# Patient Record
Sex: FEMALE | Race: WHITE | NOT HISPANIC OR LATINO | Employment: FULL TIME | URBAN - METROPOLITAN AREA
[De-identification: names, ages, dates, MRNs, and addresses within clinical notes are randomized per-mention and may not be internally consistent; named-entity substitution may affect disease eponyms.]

---

## 2017-05-23 ENCOUNTER — OFFICE VISIT (OUTPATIENT)
Dept: SLEEP MEDICINE | Facility: CLINIC | Age: 42
End: 2017-05-23
Payer: COMMERCIAL

## 2017-05-23 VITALS
SYSTOLIC BLOOD PRESSURE: 130 MMHG | WEIGHT: 228 LBS | BODY MASS INDEX: 30.88 KG/M2 | OXYGEN SATURATION: 98 % | DIASTOLIC BLOOD PRESSURE: 88 MMHG | HEIGHT: 72 IN | HEART RATE: 71 BPM

## 2017-05-23 DIAGNOSIS — F51.04 PSYCHOPHYSIOLOGICAL INSOMNIA: Primary | ICD-10-CM

## 2017-05-23 PROCEDURE — 99204 OFFICE O/P NEW MOD 45 MIN: CPT | Mod: S$GLB,,, | Performed by: INTERNAL MEDICINE

## 2017-05-23 PROCEDURE — 99999 PR PBB SHADOW E&M-NEW PATIENT-LVL III: CPT | Mod: PBBFAC,,, | Performed by: INTERNAL MEDICINE

## 2017-05-23 PROCEDURE — 1160F RVW MEDS BY RX/DR IN RCRD: CPT | Mod: S$GLB,,, | Performed by: INTERNAL MEDICINE

## 2017-05-23 RX ORDER — ZALEPLON 5 MG/1
5 CAPSULE ORAL NIGHTLY PRN
Qty: 30 CAPSULE | Refills: 0 | Status: SHIPPED | OUTPATIENT
Start: 2017-05-23 | End: 2017-06-22

## 2017-05-23 NOTE — PROGRESS NOTES
Aura Lynn  was seen as a new patient for the evaluation of  Restless sleep.    CHIEF COMPLAINT:    Chief Complaint   Patient presents with    Sleep Apnea    Snoring       HISTORY OF PRESENT ILLNESS: Aura Lynn is a 41 y.o. female is here for sleep evaluation.   Patient with early awakening since 7/16 daily.  +difficulty going back sleep.  In the past, patient would wake up without difficulty falling back to sleep.  +worsening of snoring per boyfriend.  No witnessed apnea but awoken from sleep a/w snoring.   Patient take Benadryl about 2 times per week and sleep better.  No parasomnia.  No rls symptoms.  No daytime sleepiness.  No cataplexy.      [place] Sleepiness Scale score during initial sleep evaluation was 2.    SLEEP ROUTINE:  Activity the hour prior to sleep: watch tv in bed    Bed partner:  boyfriend  Time to bed:  9:30 pm   Lights off:  yes  Sleep onset latency:  5-10 minutes        Disruptions or awakenings:    3-4 times     Wakeup time:      5:30 am   Perceived sleep quality:  Tire on most day; feel better if sleep with benadryl       Daytime naps:      none  Weekend sleep routine:      10-11 pm to 7 am (better on weekend)  Caffeine use: 1 soda per day and green tea  exercise habit:   Weight train 4 days per week and cardio 5 days per week      PAST MEDICAL HISTORY:    Active Ambulatory Problems     Diagnosis Date Noted    No Active Ambulatory Problems     Resolved Ambulatory Problems     Diagnosis Date Noted    No Resolved Ambulatory Problems     No Additional Past Medical History                PAST SURGICAL HISTORY:    History reviewed. No pertinent surgical history.      FAMILY HISTORY:                History reviewed. No pertinent family history.    SOCIAL HISTORY:          Tobacco:   History   Smoking Status    Never Smoker   Smokeless Tobacco    Never Used       alcohol use:    History   Alcohol Use    1.2 oz/week    2 Glasses of wine per week                 Occupation:   "    ALLERGIES:  Review of patient's allergies indicates:  No Known Allergies    CURRENT MEDICATIONS:    No current outpatient prescriptions on file.     No current facility-administered medications for this visit.                   REVIEW OF SYSTEMS:     Sleep related symptoms as per HPI.  CONST:Denies weight gain    HEENT: Denies sinus congestion  PULM: Denies dyspnea  CARD:  Denies palpitations   GI:  Denies acid reflux  : Denies polyuria  NEURO: Denies headaches  PSYCH: Denies mood disturbance  HEME: Denies anemia   Otherwise, a balance of systems reviewed is negative.          PHYSICAL EXAM:  Vitals:    05/23/17 1500   BP: 130/88   Pulse: 71   SpO2: 98%   Weight: 103.4 kg (228 lb)   Height: 6' 1" (1.854 m)   PainSc: 0-No pain     Body mass index is 30.08 kg/m².     GENERAL: Normal development, well groomed  HEENT:  Conjunctivae are non-erythematous; Pupils equal, round, and reactive to light; Nose is symmetrical; Nasal mucosa is pink and moist; Septum is midline; Inferior turbinates are normal; Nasal airflow is normal; Posterior pharynx is pink; Modified Mallampati: 2; Posterior palate is normal; Tonsils +1; Uvula is normal and pink;Tongue is normal; Dentition is fair; No TMJ tenderness; Jaw opening and protrusion without click and without discomfort.  NECK: Supple. Neck circumference is 14 inches. No thyromegaly. No palpable nodes.     SKIN: On face and neck: No abrasions, no rashes, no lesions.  No subcutaneous nodules are palpable.  RESPIRATORY: Chest is clear to auscultation.  Normal chest expansion and non-labored breathing at rest.  CARDIOVASCULAR: Normal S1, S2.  No murmurs, gallops or rubs. No carotid bruits bilaterally.  EXTREMITIES: No edema. No clubbing. No cyanosis. Station normal. Gait normal.        NEURO/PSYCH: Oriented to time, place and person. Normal attention span and concentration. Affect is full. Mood is normal.                                              DATA no prior sleep " study  No results found for: TSH  ASSESSMENT    ICD-10-CM ICD-9-CM    1. Psychophysiological insomnia F51.04 307.42 zaleplon (SONATA) 5 MG Cap       PLAN:    Loud snoring - with maintenance therapy.  If sleep quality does not improve with cbt, then will persue psg for narda evaluation.     Insomnia - maintenance is the issue.  Suspect condition.  Stimulus control and sleep restriction d/w patient.  Sleep time 11 pm to 5:30 am.  Prn sonata.  Sleep diary per cbt-i requested.      Diagnostic: Polysomnogram. The nature of this procedure and its indication was discussed with the patient.     Education: During our discussion today, we talked about the etiology of obstructive sleep apnea as well as the potential ramifications of untreated sleep apnea, which could include daytime sleepiness, hypertension, heart disease and/or stroke.     Precautions: The patient was advised to abstain from driving should they feel sleepy or drowsy.       Patient will Return in about 4 weeks (around 6/20/2017).

## 2017-05-23 NOTE — PATIENT INSTRUCTIONS
Stimulus control  1. Go to bed only when sleepy AND after 11 pm.   2. Do not watch television, read, eat, or worry while in bed. Use bed only for sleep and sex.   3. Get out of bed if unable to fall asleep within twenty minutes and go to another room.  Do something different like reading a book.  Dont engage in stimulating activity.  Return to bed only when you are sleepy.  Repeat this step as many times as necessary throughout the night.   4. Set an alarm clock to wake up at a fixed time each morning including weekends.  Wake up at 5:30 AM.  5. Do not take a nap during the day.

## 2017-07-11 ENCOUNTER — OFFICE VISIT (OUTPATIENT)
Dept: SLEEP MEDICINE | Facility: CLINIC | Age: 42
End: 2017-07-11
Payer: COMMERCIAL

## 2017-07-11 VITALS
BODY MASS INDEX: 30.88 KG/M2 | SYSTOLIC BLOOD PRESSURE: 120 MMHG | WEIGHT: 228 LBS | OXYGEN SATURATION: 99 % | DIASTOLIC BLOOD PRESSURE: 80 MMHG | HEIGHT: 72 IN | HEART RATE: 69 BPM

## 2017-07-11 DIAGNOSIS — G47.33 OSA (OBSTRUCTIVE SLEEP APNEA): Primary | ICD-10-CM

## 2017-07-11 DIAGNOSIS — G47.00 INSOMNIA, UNSPECIFIED TYPE: ICD-10-CM

## 2017-07-11 DIAGNOSIS — F41.9 ANXIETY: ICD-10-CM

## 2017-07-11 PROCEDURE — 99214 OFFICE O/P EST MOD 30 MIN: CPT | Mod: S$GLB,,, | Performed by: INTERNAL MEDICINE

## 2017-07-11 PROCEDURE — 99999 PR PBB SHADOW E&M-EST. PATIENT-LVL III: CPT | Mod: PBBFAC,,, | Performed by: INTERNAL MEDICINE

## 2017-07-11 RX ORDER — DOXEPIN HYDROCHLORIDE 10 MG/1
10 CAPSULE ORAL NIGHTLY
Qty: 30 CAPSULE | Refills: 2 | Status: SHIPPED | OUTPATIENT
Start: 2017-07-11 | End: 2017-08-10

## 2017-07-11 NOTE — PROGRESS NOTES
Aura Lynn  was seen as a follow up.    CHIEF COMPLAINT:    Chief Complaint   Patient presents with    Insomnia       HISTORY OF PRESENT ILLNESS: Aura Lynn is a 42 y.o. female is here for sleep evaluation.   Patient with early awakening since 7/16 daily.  +difficulty going back sleep.  In the past, patient would wake up without difficulty falling back to sleep.  +worsening of snoring per boyfriend.  No witnessed apnea but awoken from sleep a/w snoring.   Patient take Benadryl about 2 times per week and sleep better.  No parasomnia.  No rls symptoms.  No daytime sleepiness.  No cataplexy.      [place] Sleepiness Scale score during initial sleep evaluation was 2.    Stimulus control and sleep restriction was d/w patient during last appointment.  Sonata was tried without improvement.  Sleep maintenance improved.  +palpitation upon awake on most day.  Patient admitted to lots of stress at work to concern about job security.  In addition, patient admitted to lots of anxiety about sleeping.  Sleep latency has worsen with sleep restriction.      SLEEP ROUTINE:  Activity the hour prior to sleep: watch tv in bed    Bed partner:  boyfriend  Time to bed:  10 pm   Lights off:  yes  Sleep onset latency:  20 minutes        Disruptions or awakenings:    1-2 times  (5 minutes to 60 minutes)  Wakeup time:      5:30 am   Perceived sleep quality:  Tire on most day; feel better if sleep with benadryl       Daytime naps:      none  Weekend sleep routine:      10-11 pm to 7 am (better on weekend)  Caffeine use: 1 soda per day and green tea  exercise habit:   Weight train 4 days per week and cardio 5 days per week      PAST MEDICAL HISTORY:    Active Ambulatory Problems     Diagnosis Date Noted    No Active Ambulatory Problems     Resolved Ambulatory Problems     Diagnosis Date Noted    No Resolved Ambulatory Problems     No Additional Past Medical History                PAST SURGICAL HISTORY:    History reviewed. No pertinent  "surgical history.      FAMILY HISTORY:                History reviewed. No pertinent family history.    SOCIAL HISTORY:          Tobacco:   History   Smoking Status    Never Smoker   Smokeless Tobacco    Never Used       alcohol use:    History   Alcohol Use    1.2 oz/week    2 Glasses of wine per week                 Occupation:      ALLERGIES:  Review of patient's allergies indicates:  No Known Allergies    CURRENT MEDICATIONS:    Current Outpatient Prescriptions   Medication Sig Dispense Refill    doxepin (SINEQUAN) 10 MG capsule Take 1 capsule (10 mg total) by mouth every evening. 30 capsule 2     No current facility-administered medications for this visit.                   REVIEW OF SYSTEMS:     Sleep related symptoms as per HPI.  CONST:Denies weight gain    HEENT: Denies sinus congestion  PULM: Denies dyspnea  CARD:  Denies palpitations   GI:  Denies acid reflux  : Denies polyuria  NEURO: Denies headaches  PSYCH: Denies mood disturbance  HEME: Denies anemia   Otherwise, a balance of systems reviewed is negative.          PHYSICAL EXAM:  Vitals:    07/11/17 1436   BP: 120/80   Pulse: 69   SpO2: 99%   Weight: 103.4 kg (228 lb)   Height: 6' 1" (1.854 m)   PainSc: 0-No pain     Body mass index is 30.08 kg/m².     GENERAL: Normal development, well groomed  HEENT:  Conjunctivae are non-erythematous; Pupils equal, round, and reactive to light; Nose is symmetrical; Nasal mucosa is pink and moist; Septum is midline; Inferior turbinates are normal; Nasal airflow is normal; Posterior pharynx is pink; Modified Mallampati: 2; Posterior palate is normal; Tonsils +1; Uvula is normal and pink;Tongue is normal; Dentition is fair; No TMJ tenderness; Jaw opening and protrusion without click and without discomfort.  NECK: Supple. Neck circumference is 14 inches. No thyromegaly. No palpable nodes.     SKIN: On face and neck: No abrasions, no rashes, no lesions.  No subcutaneous nodules are palpable.  RESPIRATORY: " Chest is clear to auscultation.  Normal chest expansion and non-labored breathing at rest.  CARDIOVASCULAR: Normal S1, S2.  No murmurs, gallops or rubs. No carotid bruits bilaterally.  EXTREMITIES: No edema. No clubbing. No cyanosis. Station normal. Gait normal.        NEURO/PSYCH: Oriented to time, place and person. Normal attention span and concentration. Affect is full. Mood is normal.                                              DATA no prior sleep study  No results found for: TSH  ASSESSMENT    ICD-10-CM ICD-9-CM    1. DORINA (obstructive sleep apnea) G47.33 327.23 Polysomnogram (CPAP will be added if patient meets diagnostic criteria.)   2. Insomnia, unspecified type G47.00 780.52 doxepin (SINEQUAN) 10 MG capsule   3. Anxiety F41.9 300.00 Ambulatory consult to Psychiatry       PLAN:    Loud snoring - with maintenance therapy.  Will send for sleep study due to persisting maintenance insomnia despite cbt and sleep aid.    Insomnia - maintenance is the issue.  Worse with cbt.  DDx:  Anxiety vs hyperarousal from untreated dorina vs gerd.  Recommend sleep at 30 degree incline since patient has sleep number bed.  Will refer to psychiatry due to anxiety.  Start a trial of doxepin and reassess.  Baseline psg.      Education: During our discussion today, we talked about the etiology of obstructive sleep apnea as well as the potential ramifications of untreated sleep apnea, which could include daytime sleepiness, hypertension, heart disease and/or stroke.     Precautions: The patient was advised to abstain from driving should they feel sleepy or drowsy.       Patient will No Follow-up on file.    This is 25 minutes visit, over 50% of time spent in direct consultation with patient.

## 2017-07-19 ENCOUNTER — HOSPITAL ENCOUNTER (OUTPATIENT)
Dept: SLEEP MEDICINE | Facility: OTHER | Age: 42
Discharge: HOME | End: 2017-07-19
Attending: INTERNAL MEDICINE
Payer: COMMERCIAL

## 2017-07-19 DIAGNOSIS — G47.33 OSA (OBSTRUCTIVE SLEEP APNEA): ICD-10-CM

## 2017-07-19 PROCEDURE — 95810 POLYSOM 6/> YRS 4/> PARAM: CPT | Mod: 26,,, | Performed by: INTERNAL MEDICINE

## 2017-07-19 PROCEDURE — 95810 POLYSOM 6/> YRS 4/> PARAM: CPT

## 2017-07-20 NOTE — PROGRESS NOTES
Baseline PSG study was performed on Aura Leah. Procedure was explained and questions were answered prior to the study.      EKG:  rare PAC      Difficulties:  no technical diificulties      Pt did not meet criteria for CPAP due to unsufficient sleep time.

## 2017-07-28 NOTE — PROCEDURES
"See imported Sleep Study result in "Chart Review" under the "Media tab."    (This Sleep Study was interpreted by a Board Certified Sleep Specialist who conducted an epoch-by-epoch review of the entire raw data recording.)    (The indication for this sleep study was reviewed and deemed appropriate by [facility] practice Parameters or other reasons by a Board Certified Sleep Specialist.)    "

## 2017-08-02 ENCOUNTER — OFFICE VISIT (OUTPATIENT)
Dept: PULMONOLOGY | Facility: CLINIC | Age: 42
End: 2017-08-02
Payer: COMMERCIAL

## 2017-08-02 VITALS
SYSTOLIC BLOOD PRESSURE: 116 MMHG | TEMPERATURE: 98 F | BODY MASS INDEX: 30.79 KG/M2 | HEIGHT: 72 IN | HEART RATE: 64 BPM | WEIGHT: 227.31 LBS | DIASTOLIC BLOOD PRESSURE: 88 MMHG | OXYGEN SATURATION: 99 %

## 2017-08-02 DIAGNOSIS — G47.33 OBSTRUCTIVE SLEEP APNEA: Primary | ICD-10-CM

## 2017-08-02 PROCEDURE — 99213 OFFICE O/P EST LOW 20 MIN: CPT | Mod: S$GLB,,, | Performed by: NURSE PRACTITIONER

## 2017-08-02 PROCEDURE — 99999 PR PBB SHADOW E&M-EST. PATIENT-LVL III: CPT | Mod: PBBFAC,,, | Performed by: NURSE PRACTITIONER

## 2017-08-02 RX ORDER — MEDROXYPROGESTERONE ACETATE 150 MG/ML
150 INJECTION, SUSPENSION INTRAMUSCULAR
COMMUNITY

## 2017-08-02 RX ORDER — ZOLPIDEM TARTRATE 5 MG/1
5 TABLET ORAL NIGHTLY PRN
Qty: 2 TABLET | Refills: 0 | Status: SHIPPED | OUTPATIENT
Start: 2017-08-02 | End: 2017-08-21 | Stop reason: SDUPTHER

## 2017-08-11 ENCOUNTER — PATIENT MESSAGE (OUTPATIENT)
Dept: PULMONOLOGY | Facility: CLINIC | Age: 42
End: 2017-08-11

## 2017-08-11 ENCOUNTER — TELEPHONE (OUTPATIENT)
Dept: SLEEP MEDICINE | Facility: OTHER | Age: 42
End: 2017-08-11

## 2017-08-21 ENCOUNTER — PATIENT MESSAGE (OUTPATIENT)
Dept: PULMONOLOGY | Facility: CLINIC | Age: 42
End: 2017-08-21

## 2017-08-21 RX ORDER — ZOLPIDEM TARTRATE 5 MG/1
5 TABLET ORAL NIGHTLY PRN
Qty: 2 TABLET | Refills: 0 | Status: SHIPPED | OUTPATIENT
Start: 2017-08-21 | End: 2018-01-09 | Stop reason: ALTCHOICE

## 2017-08-21 RX ORDER — ZOLPIDEM TARTRATE 5 MG/1
5 TABLET ORAL NIGHTLY PRN
Qty: 2 TABLET | Refills: 0 | Status: SHIPPED | OUTPATIENT
Start: 2017-08-21 | End: 2017-08-21 | Stop reason: CLARIF

## 2017-08-21 RX ORDER — ZOLPIDEM TARTRATE 5 MG/1
5 TABLET ORAL NIGHTLY PRN
Qty: 2 TABLET | Refills: 0 | OUTPATIENT
Start: 2017-08-21 | End: 2018-02-19

## 2017-08-21 RX ORDER — ZOLPIDEM TARTRATE 5 MG/1
5 TABLET ORAL NIGHTLY PRN
Qty: 2 TABLET | Refills: 0 | Status: SHIPPED | OUTPATIENT
Start: 2017-08-21 | End: 2017-08-21 | Stop reason: SDUPTHER

## 2017-08-24 ENCOUNTER — HOSPITAL ENCOUNTER (OUTPATIENT)
Dept: SLEEP MEDICINE | Facility: OTHER | Age: 42
Discharge: HOME | End: 2017-08-24
Attending: NURSE PRACTITIONER
Payer: COMMERCIAL

## 2017-08-24 DIAGNOSIS — G47.33 OBSTRUCTIVE SLEEP APNEA: ICD-10-CM

## 2017-08-24 PROCEDURE — 95811 POLYSOM 6/>YRS CPAP 4/> PARM: CPT | Mod: 26,,, | Performed by: INTERNAL MEDICINE

## 2017-08-24 PROCEDURE — 95811 POLYSOM 6/>YRS CPAP 4/> PARM: CPT

## 2017-08-25 NOTE — PROGRESS NOTES
"Aura Lynn to [facility] for an overnight sleep study on 08/24/2017.  Pt education/cpap explanation given prior to study.      Cpap with a Medium Eson nasal mask and chinstrap.  Heated humidity, cflex and chinstrap also used.  Pt without complaint of distress or discomfort with cpap.   Pressures explored from 4 to 11 cm H2O.  Occasional mouthleak noted at higher pressures.  Mask/strap adjusted and HOB elevated 10 degrees for a short period to decrease mouthleak.   HOB lowered again for pt comfort.   0457  Pt agreed to try full mask for remainder of titration.   Little sleep observed w/ full mask.   ? optimal setting 9cm      EKG- Lead 1 appears with rare pac.  Low saturation observed 93%.      No technical issue to report.      In AM- Pt reports: "I'm suprised as to how much sleep I got".  "I was dreaming".  (w/ nasal mask)                                 Pt states a preference to the nasal mask.  "I think it was too late when we tried the bigger mask"  "

## 2017-10-10 ENCOUNTER — PATIENT MESSAGE (OUTPATIENT)
Dept: PULMONOLOGY | Facility: CLINIC | Age: 42
End: 2017-10-10

## 2017-10-11 DIAGNOSIS — G47.33 OBSTRUCTIVE SLEEP APNEA: Primary | ICD-10-CM

## 2018-01-09 ENCOUNTER — OFFICE VISIT (OUTPATIENT)
Dept: PULMONOLOGY | Facility: CLINIC | Age: 43
End: 2018-01-09
Payer: COMMERCIAL

## 2018-01-09 VITALS
WEIGHT: 237.19 LBS | DIASTOLIC BLOOD PRESSURE: 82 MMHG | BODY MASS INDEX: 32.13 KG/M2 | SYSTOLIC BLOOD PRESSURE: 118 MMHG | OXYGEN SATURATION: 98 % | HEIGHT: 72 IN | HEART RATE: 72 BPM

## 2018-01-09 DIAGNOSIS — G47.33 OBSTRUCTIVE SLEEP APNEA: Primary | ICD-10-CM

## 2018-01-09 PROCEDURE — 99999 PR PBB SHADOW E&M-EST. PATIENT-LVL III: CPT | Mod: PBBFAC,,, | Performed by: NURSE PRACTITIONER

## 2018-01-09 PROCEDURE — 99213 OFFICE O/P EST LOW 20 MIN: CPT | Mod: S$GLB,,, | Performed by: NURSE PRACTITIONER

## 2018-01-09 RX ORDER — MUPIROCIN 20 MG/G
OINTMENT TOPICAL
COMMUNITY
Start: 2017-12-13

## 2018-01-09 NOTE — PATIENT INSTRUCTIONS
· Doing well on current CPAP therapy at pressure of 8 cm H2O with 100% compliance.  · Continue with current therapy.   Follow up in 1 year  Contact prior to if any issues or concerns should arise.

## 2018-01-09 NOTE — PROGRESS NOTES
"Subjective:       Patient ID: Aura Lynn is a 42 y.o. female.    Chief Complaint: DORINA with CPAP therapy follow up    HPI  Aura Lynn is a 42 y.o. female who presents today for CPAP follow up appointment. Her polysomnogram she was found to have significant DORINA which led to CPAP titration study. Her recommended setting in order to reach therapeutic AHI level was 8cm H2O. She has been on CPAP therapy for nearly 3 months. Her data card readings show 100% compliance with an average usage of 4 hours and 51 minutes. At the pressure of 8cm H2O her AHI is 1 per hour. She feels rested and has no physical complaints.     Review of patient's allergies indicates:  No Known Allergies  No past medical history on file.  No past surgical history on file.  Family History     None        Social History Main Topics    Smoking status: Never Smoker    Smokeless tobacco: Never Used    Alcohol use 1.2 oz/week     2 Glasses of wine per week    Drug use: No    Sexual activity: Yes     Partners: Male     Birth control/ protection: Injection       Review of Systems   Constitutional: Negative for fever.   HENT: Negative for congestion.    Respiratory: Negative for cough.    Cardiovascular: Negative for chest pain and palpitations.   Genitourinary: Negative for difficulty urinating.   Gastrointestinal: Negative for abdominal distention.   Psychiatric/Behavioral: Negative for sleep disturbance.       Objective:       Vitals:    01/09/18 0818   BP: 118/82   Pulse: 72   SpO2: 98%   Weight: 107.6 kg (237 lb 3.4 oz)   Height: 6' 1" (1.854 m)     Physical Exam   Constitutional: She is oriented to person, place, and time. No distress.   HENT:   Head: Normocephalic.   Cardiovascular: Normal rate and intact distal pulses.    Pulmonary/Chest: Normal expansion, symmetric chest wall expansion, effort normal and breath sounds normal. No respiratory distress. She has no wheezes. She has no rhonchi. She has no rales.   Musculoskeletal: " Normal range of motion.   Neurological: She is alert and oriented to person, place, and time.   Psychiatric: She has a normal mood and affect.   Vitals reviewed.    Personal Diagnostic Review    CPAP data printout reviewed 1/9/17: 100% compliance, usage average 4 hours 51 min. AHI is 1 per hour.  Assessment:         Outpatient Encounter Prescriptions as of 1/9/2018   Medication Sig Dispense Refill    doxepin (SINEQUAN) 10 MG capsule Take 1 capsule (10 mg total) by mouth every evening. 30 capsule 2    medroxyPROGESTERone (DEPO-PROVERA) 150 mg/mL injection Inject 150 mg into the muscle every 3 (three) months.      zolpidem (AMBIEN) 5 MG Tab Take 1 tablet (5 mg total) by mouth nightly as needed. 2 tablet 0     No facility-administered encounter medications on file as of 1/9/2018.      Problem List Items Addressed This Visit        Other    Obstructive sleep apnea - Primary        Plan:       · Doing well on current CPAP therapy at pressure of 8 cm H2O with 100% compliance.  · Continue with current therapy.   Follow up in 1 year  Contact prior to if any issues or concerns should arise.   Patient verbalized understanding of all information, instruction, education, recommendations provided and agrees with plan of care.

## 2019-04-05 ENCOUNTER — OFFICE VISIT (OUTPATIENT)
Dept: PULMONOLOGY | Facility: CLINIC | Age: 44
End: 2019-04-05
Payer: COMMERCIAL

## 2019-04-05 VITALS
HEIGHT: 72 IN | DIASTOLIC BLOOD PRESSURE: 76 MMHG | SYSTOLIC BLOOD PRESSURE: 138 MMHG | BODY MASS INDEX: 32.73 KG/M2 | HEART RATE: 62 BPM | WEIGHT: 241.63 LBS | OXYGEN SATURATION: 98 %

## 2019-04-05 DIAGNOSIS — G47.33 OSA (OBSTRUCTIVE SLEEP APNEA): Primary | ICD-10-CM

## 2019-04-05 PROCEDURE — 99213 PR OFFICE/OUTPT VISIT, EST, LEVL III, 20-29 MIN: ICD-10-PCS | Mod: S$GLB,,, | Performed by: HOSPITALIST

## 2019-04-05 PROCEDURE — 99213 OFFICE O/P EST LOW 20 MIN: CPT | Mod: S$GLB,,, | Performed by: HOSPITALIST

## 2019-04-05 PROCEDURE — 99999 PR PBB SHADOW E&M-EST. PATIENT-LVL III: ICD-10-PCS | Mod: PBBFAC,,, | Performed by: HOSPITALIST

## 2019-04-05 PROCEDURE — 99999 PR PBB SHADOW E&M-EST. PATIENT-LVL III: CPT | Mod: PBBFAC,,, | Performed by: HOSPITALIST

## 2019-04-05 NOTE — PROGRESS NOTES
43 year old woman with a history of DORINA diagnosed by Dr. Pugh.  Patient needs a yearly follow up regarding her CPAP machine.  Will prescribe CPAP supplies per PSG.  Plan for follow up with Dr. Pugh in sleep clinic as required by insurance.

## 2019-04-05 NOTE — PROGRESS NOTES
Subjective:       Patient ID: Aura Lynn is a 43 y.o. female.    Chief Complaint: fidel    43yoF with DORINA on CPAP @ 8cmH2O. 100% compliance, wears it 6.5 hours/night.  Feels better when wearing it, no complications.  Needs supply refill.    Review of Systems   Constitutional: Negative for fever, chills, activity change, fatigue and night sweats.   HENT: Negative for nosebleeds, postnasal drip, rhinorrhea and ear pain.    Eyes: Negative for itching.   Respiratory: Negative for chest tightness and orthopnea.    Genitourinary: Negative for difficulty urinating.   Endocrine: Negative for polydipsia.    Musculoskeletal: Negative for back pain and joint swelling.   Neurological: Negative for light-headedness.       Objective:      Physical Exam   Constitutional: She is oriented to person, place, and time. She appears well-developed and well-nourished.   HENT:   Mouth/Throat: No oropharyngeal exudate.   Neck: Normal range of motion.   Cardiovascular: Normal rate and regular rhythm.   No murmur heard.  Pulmonary/Chest: Normal expansion. No stridor. She has no wheezes. She has no rales.   Abdominal: Soft.   Musculoskeletal: Normal range of motion. She exhibits no edema.   Neurological: She is alert and oriented to person, place, and time.   Skin: She is not diaphoretic.     Personal Diagnostic Review    Pulmonary Function Tests 4/5/2019   SpO2 98   Height 73.000   Weight 3865.99   BMI (Calculated) 31.9   Some recent data might be hidden         Assessment:       1. DORINA (obstructive sleep apnea)        Outpatient Encounter Medications as of 4/5/2019   Medication Sig Dispense Refill    doxepin (SINEQUAN) 10 MG capsule Take 1 capsule (10 mg total) by mouth every evening. 30 capsule 2    medroxyPROGESTERone (DEPO-PROVERA) 150 mg/mL injection Inject 150 mg into the muscle every 3 (three) months.      mupirocin (BACTROBAN) 2 % ointment        No facility-administered encounter medications on file as of 4/5/2019.       Orders Placed This Encounter   Procedures    CPAP/BIPAP SUPPLIES     Order Specific Question:   Type of mask:     Answer:   Nasal     Comments:   Dreamear     Order Specific Question:   Headgear?     Answer:   Yes     Order Specific Question:   Tubing?     Answer:   Yes     Order Specific Question:   Humidifier chamber?     Answer:   Yes     Order Specific Question:   Chin strap?     Answer:   Yes     Order Specific Question:   Filters?     Answer:   Yes     Order Specific Question:   Cushions?     Answer:   Yes     Order Specific Question:   Length of need (1-99 months):     Answer:   99       Plan:          1. Refill supplies.  Refer to sleep medicine.    RTC PRN.    Discussed w/ Dr. Alvarenga.    Basil Molina MD  [employer]